# Patient Record
Sex: MALE | Race: WHITE | Employment: FULL TIME | URBAN - NONMETROPOLITAN AREA
[De-identification: names, ages, dates, MRNs, and addresses within clinical notes are randomized per-mention and may not be internally consistent; named-entity substitution may affect disease eponyms.]

---

## 2017-05-04 ENCOUNTER — APPOINTMENT (OUTPATIENT)
Dept: GENERAL RADIOLOGY | Age: 42
End: 2017-05-04

## 2017-05-04 ENCOUNTER — HOSPITAL ENCOUNTER (EMERGENCY)
Age: 42
Discharge: HOME | End: 2017-05-04

## 2017-05-04 VITALS
HEART RATE: 76 BPM | DIASTOLIC BLOOD PRESSURE: 76 MMHG | BODY MASS INDEX: 24.65 KG/M2 | OXYGEN SATURATION: 99 % | SYSTOLIC BLOOD PRESSURE: 122 MMHG | HEIGHT: 72 IN | TEMPERATURE: 98 F | WEIGHT: 182 LBS | RESPIRATION RATE: 20 BRPM

## 2017-05-04 DIAGNOSIS — S61.219A LACERATION OF FINGER, INITIAL ENCOUNTER: Primary | ICD-10-CM

## 2017-05-04 PROCEDURE — 12002 RPR S/N/AX/GEN/TRNK2.6-7.5CM: CPT | Performed by: NURSE PRACTITIONER

## 2017-05-04 PROCEDURE — 99282 EMERGENCY DEPT VISIT SF MDM: CPT

## 2017-05-04 PROCEDURE — 6360000002 HC RX W HCPCS: Performed by: NURSE PRACTITIONER

## 2017-05-04 PROCEDURE — 12002 RPR S/N/AX/GEN/TRNK2.6-7.5CM: CPT

## 2017-05-04 PROCEDURE — 90471 IMMUNIZATION ADMIN: CPT | Performed by: NURSE PRACTITIONER

## 2017-05-04 PROCEDURE — 90715 TDAP VACCINE 7 YRS/> IM: CPT | Performed by: NURSE PRACTITIONER

## 2017-05-04 PROCEDURE — 2500000003 HC RX 250 WO HCPCS: Performed by: NURSE PRACTITIONER

## 2017-05-04 PROCEDURE — 73130 X-RAY EXAM OF HAND: CPT

## 2017-05-04 RX ORDER — ONDANSETRON 4 MG/1
4 TABLET, ORALLY DISINTEGRATING ORAL ONCE
Status: COMPLETED | OUTPATIENT
Start: 2017-05-04 | End: 2017-05-04

## 2017-05-04 RX ORDER — CEPHALEXIN 500 MG/1
500 CAPSULE ORAL 4 TIMES DAILY
Qty: 28 CAPSULE | Refills: 0 | Status: SHIPPED | OUTPATIENT
Start: 2017-05-04

## 2017-05-04 RX ORDER — LIDOCAINE HYDROCHLORIDE 10 MG/ML
20 INJECTION, SOLUTION INFILTRATION; PERINEURAL ONCE
Status: COMPLETED | OUTPATIENT
Start: 2017-05-04 | End: 2017-05-04

## 2017-05-04 RX ORDER — OXYCODONE HYDROCHLORIDE AND ACETAMINOPHEN 5; 325 MG/1; MG/1
1 TABLET ORAL EVERY 4 HOURS PRN
Qty: 15 TABLET | Refills: 0 | Status: SHIPPED | OUTPATIENT
Start: 2017-05-04 | End: 2017-05-11

## 2017-05-04 RX ORDER — ONDANSETRON 4 MG/1
4 TABLET, ORALLY DISINTEGRATING ORAL EVERY 8 HOURS PRN
Qty: 10 TABLET | Refills: 0 | Status: SHIPPED | OUTPATIENT
Start: 2017-05-04

## 2017-05-04 RX ADMIN — TETANUS TOXOID, REDUCED DIPHTHERIA TOXOID AND ACELLULAR PERTUSSIS VACCINE, ADSORBED 0.5 ML: 5; 2.5; 8; 8; 2.5 SUSPENSION INTRAMUSCULAR at 21:27

## 2017-05-04 RX ADMIN — LIDOCAINE HYDROCHLORIDE 20 ML: 10 INJECTION, SOLUTION INFILTRATION; PERINEURAL at 21:25

## 2017-05-04 RX ADMIN — ONDANSETRON 4 MG: 4 TABLET, ORALLY DISINTEGRATING ORAL at 21:36

## 2017-05-04 ASSESSMENT — PAIN SCALES - GENERAL: PAINLEVEL_OUTOF10: 0

## 2017-09-11 ENCOUNTER — OFFICE VISIT (OUTPATIENT)
Dept: UROLOGY | Facility: CLINIC | Age: 42
End: 2017-09-11

## 2017-09-11 VITALS — TEMPERATURE: 97.5 F | BODY MASS INDEX: 26.01 KG/M2 | HEIGHT: 72 IN | WEIGHT: 192 LBS

## 2017-09-11 DIAGNOSIS — Z30.09 VISIT FOR VASECTOMY EVALUATION: Primary | ICD-10-CM

## 2017-09-11 PROCEDURE — 99203 OFFICE O/P NEW LOW 30 MIN: CPT | Performed by: UROLOGY

## 2017-09-11 RX ORDER — ALPRAZOLAM 2 MG/1
2 TABLET ORAL AS NEEDED
Qty: 1 TABLET | Refills: 0 | Status: SHIPPED | OUTPATIENT
Start: 2017-09-11 | End: 2020-03-04

## 2017-09-11 RX ORDER — HYDROCODONE BITARTRATE AND ACETAMINOPHEN 7.5; 325 MG/1; MG/1
1 TABLET ORAL EVERY 4 HOURS PRN
Qty: 16 TABLET | Refills: 0 | Status: SHIPPED | OUTPATIENT
Start: 2017-09-11 | End: 2020-03-04

## 2017-09-11 NOTE — PROGRESS NOTES
Mr. Gilbert is 42 y.o. male    CHIEF COMPLAINT: I am here to discuss a vasectomy.     HPI  The patient has been pondering the option of a vasectomy for>5 months. With regard to context of the decision, he presently has No children. He is . Associated/Relevant symptoms/signs include None. He voices no additional questions about birth control options.       The following portions of the patient's history were reviewed and updated as appropriate: allergies, current medications, past family history, past medical history, past social history, past surgical history and problem list.    Review of Systems   Constitutional: Negative for appetite change and fever.   HENT: Negative for hearing loss and sore throat.    Eyes: Negative for pain and redness.   Respiratory: Negative for cough and shortness of breath.    Cardiovascular: Negative for chest pain and leg swelling.   Gastrointestinal: Negative for anal bleeding, nausea and vomiting.   Endocrine: Negative for cold intolerance and heat intolerance.   Genitourinary: Negative for dysuria, flank pain and hematuria.   Musculoskeletal: Negative for joint swelling and myalgias.   Skin: Negative for color change and rash.   Allergic/Immunologic: Negative for immunocompromised state.   Neurological: Negative for dizziness and speech difficulty.   Hematological: Negative for adenopathy. Does not bruise/bleed easily.   Psychiatric/Behavioral: Negative for dysphoric mood and suicidal ideas.       No current outpatient prescriptions on file.    History reviewed. No pertinent past medical history.    History reviewed. No pertinent surgical history.    Social History     Social History   • Marital status:      Spouse name: N/A   • Number of children: N/A   • Years of education: N/A     Social History Main Topics   • Smoking status: Never Smoker   • Smokeless tobacco: None   • Alcohol use None   • Drug use: None   • Sexual activity: Not Asked     Other Topics Concern   •  "None     Social History Narrative   • None       No family history on file.    Temp 97.5 °F (36.4 °C)  Ht 72\" (182.9 cm)  Wt 192 lb (87.1 kg)  BMI 26.04 kg/m2    Physical Exam  Constitutional: He is oriented to person, place, and time. He appears well-developed and well-nourished.   HENT:   Head: Normocephalic and atraumatic.   Eyes: Conjunctivae are normal.   Neck: Normal range of motion. Neck supple.   Cardiovascular: Normal rate and regular rhythm.    Pulmonary/Chest: Effort normal. No respiratory distress. He exhibits no tenderness.   Abdominal: Soft. He exhibits no distension. There is no tenderness. Hernia confirmed negative in the right inguinal area and confirmed negative in the left inguinal area.   Genitourinary: Testes normal and penis normal. Right testis shows no mass, no swelling and no tenderness. Left testis shows no mass, no swelling and no tenderness. No phimosis, paraphimosis or penile tenderness.   Genitourinary Comments: The epididymis is palpably normal bilaterally without mass.  The vas deferens is also palpable bilaterally in the usual location and appears accessible for vasectomy.   Musculoskeletal: Normal range of motion. He exhibits no edema or deformity.   Neurological: He is alert and oriented to person, place, and time.   Skin: Skin is warm and dry.   Psychiatric: He has a normal mood and affect. His behavior is normal.   Vitals reviewed.        No results found for this or any previous visit.    Imaging Results (last 7 days)     ** No results found for the last 168 hours. **          Assessment and Plan  Diagnoses and all orders for this visit:    Visit for vasectomy evaluation  The patient desires vasectomy.  Risks of this procedure are discussed.  We discussed that it does take up to 6 months for a patient to clear the proximal sperm through the process of ejaculation.  It is explained that postoperative specimens are essential before consideration of birth control cessation.  " Risks of bleeding, infection, sperm granuloma, testicular pain that can become prolonged such as post vasectomy neuralgia, recanalization with resumption of fertility status, testicular atrophy are included in the discussion.  The patient is made aware of other birth control options including permanent sterilization procedures for females.  It is also explained the vasectomy does not reduce the risk of sexually transmitted disease.  Discussion of the use of preprocedural benzodiazepine and postoperative opiate narcotic relief is also undertaken.  Brief addiction assessment concluded.  The patient has consented to the procedure.      Quintin León MD  09/11/17  8:50 AM      Cc: Samir Le MD

## 2017-10-31 ENCOUNTER — TRANSCRIBE ORDERS (OUTPATIENT)
Dept: ADMINISTRATIVE | Facility: HOSPITAL | Age: 42
End: 2017-10-31

## 2017-10-31 DIAGNOSIS — Z00.00 ROUTINE GENERAL MEDICAL EXAMINATION AT A HEALTH CARE FACILITY: Primary | ICD-10-CM

## 2017-11-10 ENCOUNTER — OFFICE VISIT (OUTPATIENT)
Dept: UROLOGY | Facility: CLINIC | Age: 42
End: 2017-11-10

## 2017-11-10 ENCOUNTER — APPOINTMENT (OUTPATIENT)
Dept: CT IMAGING | Facility: HOSPITAL | Age: 42
End: 2017-11-10
Attending: FAMILY MEDICINE

## 2017-11-10 DIAGNOSIS — Z30.2 ENCOUNTER FOR VASECTOMY: Primary | ICD-10-CM

## 2017-11-10 PROCEDURE — 55250 REMOVAL OF SPERM DUCT(S): CPT | Performed by: UROLOGY

## 2017-11-15 ENCOUNTER — HOSPITAL ENCOUNTER (OUTPATIENT)
Dept: CT IMAGING | Facility: HOSPITAL | Age: 42
End: 2017-11-15
Attending: FAMILY MEDICINE

## 2019-01-11 ENCOUNTER — OFFICE VISIT (OUTPATIENT)
Dept: FAMILY MEDICINE CLINIC | Facility: CLINIC | Age: 44
End: 2019-01-11

## 2019-01-11 VITALS
SYSTOLIC BLOOD PRESSURE: 142 MMHG | TEMPERATURE: 98.2 F | WEIGHT: 187 LBS | HEIGHT: 72 IN | RESPIRATION RATE: 18 BRPM | HEART RATE: 78 BPM | DIASTOLIC BLOOD PRESSURE: 76 MMHG | OXYGEN SATURATION: 99 % | BODY MASS INDEX: 25.33 KG/M2

## 2019-01-11 DIAGNOSIS — J06.9 UPPER RESPIRATORY TRACT INFECTION, UNSPECIFIED TYPE: Primary | ICD-10-CM

## 2019-01-11 DIAGNOSIS — J02.9 PHARYNGITIS, UNSPECIFIED ETIOLOGY: ICD-10-CM

## 2019-01-11 PROCEDURE — 96372 THER/PROPH/DIAG INJ SC/IM: CPT | Performed by: FAMILY MEDICINE

## 2019-01-11 PROCEDURE — 99202 OFFICE O/P NEW SF 15 MIN: CPT | Performed by: FAMILY MEDICINE

## 2019-01-11 RX ORDER — DEXAMETHASONE SODIUM PHOSPHATE 10 MG/ML
10 INJECTION INTRAMUSCULAR; INTRAVENOUS ONCE
Status: COMPLETED | OUTPATIENT
Start: 2019-01-11 | End: 2019-01-11

## 2019-01-11 RX ORDER — DEXAMETHASONE SODIUM PHOSPHATE 10 MG/ML
10 INJECTION INTRAMUSCULAR; INTRAVENOUS ONCE
Status: CANCELLED | OUTPATIENT
Start: 2019-01-11

## 2019-01-11 RX ADMIN — DEXAMETHASONE SODIUM PHOSPHATE 10 MG: 10 INJECTION INTRAMUSCULAR; INTRAVENOUS at 12:26

## 2019-01-11 NOTE — PROGRESS NOTES
"Subjective cc: sore throat   Jack Gilbert is a 43 y.o. male who presents with complaint of sore throat.  Pt reports he was eating and sneezed and then felt a pop on left side of throat. Since that time he has been having left ear pain and sore throat. 8/10 when swallows. He has tried flonase and zyrtec without improvement.  He has been taking amoxicillin. He developed congestion last night. Pt voice sounds different to him.     Sore Throat    This is a new problem. The current episode started in the past 7 days. The problem has been unchanged. The pain is worse on the left side. There has been no fever. The pain is at a severity of 8/10 (when swallowing ). The pain is moderate. Associated symptoms include ear pain (left) and trouble swallowing. He has had no exposure to strep or mono. Treatments tried: zyrtec, flonase. The treatment provided no relief.        The following portions of the patient's history were reviewed and updated as appropriate: allergies, current medications, past family history, past medical history, past social history, past surgical history and problem list.        Review of Systems   Constitutional: Negative for chills and fever.   HENT: Positive for ear pain (left), sore throat and trouble swallowing.    All other systems reviewed and are negative.      Objective   Blood pressure 142/76, pulse 78, temperature 98.2 °F (36.8 °C), temperature source Oral, resp. rate 18, height 182.9 cm (72\"), weight 84.8 kg (187 lb), SpO2 99 %.  Physical Exam   Constitutional: He is oriented to person, place, and time. He appears well-developed and well-nourished. He is active and cooperative.  Non-toxic appearance. He has a sickly appearance. No distress.   HENT:   Head: Normocephalic and atraumatic.   Right Ear: External ear normal.   Left Ear: External ear normal.   Nose: Nose normal.   Mouth/Throat: Oropharynx is clear and moist. No oropharyngeal exudate.   Eyes: Conjunctivae and EOM are normal. Right eye " exhibits no discharge. Left eye exhibits no discharge.   Neck: Normal range of motion. No tracheal deviation present. No thyromegaly present.   Cardiovascular: Normal rate, regular rhythm, normal heart sounds and intact distal pulses.   Pulmonary/Chest: Effort normal and breath sounds normal. No stridor. No respiratory distress. He has no wheezes. He exhibits no tenderness.   Lymphadenopathy:     He has no cervical adenopathy.   Neurological: He is alert and oriented to person, place, and time. He exhibits normal muscle tone. Coordination normal.   Skin: Skin is warm and dry. He is not diaphoretic.   Psychiatric: He has a normal mood and affect. His behavior is normal. Judgment and thought content normal.   Nursing note and vitals reviewed.      Assessment/Plan   Problems Addressed this Visit     None      Visit Diagnoses     Upper respiratory tract infection, unspecified type    -  Primary    Pharyngitis, unspecified etiology        Relevant Medications    dexamethasone (DECADRON) injection 10 mg (Completed)          PLAN:     #1 pharyngitis/URI: new, steroid shot today to help with symptoms, advised on conservative measures, can complete course of abx since he has already started them, return if not improving           This document has been electronically signed by Fiona Raines MD on January 21, 2019 1:06 PM

## 2019-01-22 DIAGNOSIS — Z30.2 ENCOUNTER FOR VASECTOMY: Primary | ICD-10-CM

## 2020-01-07 ENCOUNTER — LAB (OUTPATIENT)
Dept: LAB | Facility: HOSPITAL | Age: 45
End: 2020-01-07

## 2020-01-07 ENCOUNTER — TELEPHONE (OUTPATIENT)
Dept: UROLOGY | Facility: CLINIC | Age: 45
End: 2020-01-07

## 2020-01-07 DIAGNOSIS — Z30.2 ENCOUNTER FOR VASECTOMY: ICD-10-CM

## 2020-01-07 LAB — SPERM - POST VASECTOMY: NORMAL

## 2020-01-07 PROCEDURE — 89321 SEMEN ANAL SPERM DETECTION: CPT | Performed by: UROLOGY

## 2020-01-07 NOTE — TELEPHONE ENCOUNTER
Patient's wife called to ask questions about the process of bringing in a semen sample. I told her the patient or herself would need to  a sample cup first. Then he would need to do the sample and bring it to the lab as soon as he can, no more than 2 hours after the sample was done. She voiced understanding.

## 2020-03-04 ENCOUNTER — OFFICE VISIT (OUTPATIENT)
Dept: FAMILY MEDICINE CLINIC | Facility: CLINIC | Age: 45
End: 2020-03-04

## 2020-03-04 VITALS
WEIGHT: 190.2 LBS | BODY MASS INDEX: 25.76 KG/M2 | TEMPERATURE: 98.2 F | HEIGHT: 72 IN | OXYGEN SATURATION: 98 % | HEART RATE: 67 BPM | SYSTOLIC BLOOD PRESSURE: 139 MMHG | DIASTOLIC BLOOD PRESSURE: 78 MMHG | RESPIRATION RATE: 18 BRPM

## 2020-03-04 DIAGNOSIS — B34.9 VIRAL SYNDROME: ICD-10-CM

## 2020-03-04 DIAGNOSIS — R52 GENERALIZED BODY ACHES: Primary | ICD-10-CM

## 2020-03-04 LAB
EXPIRATION DATE: NORMAL
FLUAV AG NPH QL: NEGATIVE
FLUBV AG NPH QL: NEGATIVE
INTERNAL CONTROL: NORMAL
Lab: NORMAL

## 2020-03-04 PROCEDURE — 87804 INFLUENZA ASSAY W/OPTIC: CPT | Performed by: NURSE PRACTITIONER

## 2020-03-04 PROCEDURE — 99213 OFFICE O/P EST LOW 20 MIN: CPT | Performed by: NURSE PRACTITIONER

## 2020-03-04 NOTE — PROGRESS NOTES
Chief Complaint   Patient presents with   • Generalized Body Aches     Headache and chills.          No Known Allergies    HPI:  Jack Gilbert is a 44 y.o. male presents today with complaints of generalized body aches, headache, chills that was a sudden onset yesterday.      History reviewed. No pertinent past medical history.  Past Surgical History:   Procedure Laterality Date   • VASECTOMY  11/10/2017     Social History     Socioeconomic History   • Marital status:      Spouse name: Not on file   • Number of children: Not on file   • Years of education: Not on file   • Highest education level: Not on file   Tobacco Use   • Smoking status: Never Smoker   • Smokeless tobacco: Never Used   Substance and Sexual Activity   • Alcohol use: No     Frequency: Never   • Drug use: Defer   • Sexual activity: Defer     Family History   Problem Relation Age of Onset   • No Known Problems Mother    • Heart disease Father    • No Known Problems Sister        Current Outpatient Medications on File Prior to Visit   Medication Sig Dispense Refill   • [DISCONTINUED] ALPRAZolam (XANAX) 2 MG tablet Take 1 tablet by mouth As Needed for Anxiety for up to 1 dose. 1 tablet 0   • [DISCONTINUED] HYDROcodone-acetaminophen (NORCO) 7.5-325 MG per tablet Take 1 tablet by mouth Every 4 (Four) Hours As Needed for Moderate Pain  or Severe Pain  for up to 16 doses. 16 tablet 0     No current facility-administered medications on file prior to visit.         ROS:  REVIEW OF SYMPTOMS: (Positives bolded)  General:  weight loss, fever, chills, night sweats, fatigue, appetite loss, body aches  HEENT:  sore throat tinnitus, bloody nose, hearing loss, sinus pain/pressure, ear pain/pressure.   Respiratory: shortness of breath, cough, hemoptysis, wheezing, pleurisy,   Cardiovascular:  chest pain, PND, palpitation, edema, orthopnea, syncope, swelling of extremities  Gastro: Nausea, vomiting, diarrhea, hematemesis, abdominal pain, constipation  Neuro:   "Migraine, numbness, ataxia, tremor, vertigo, weakness, memory loss, Irritability, headache  Allergic/immune: allergic rhinitis, hay fever, asthma, hives  \"All other systems reviewed and negative, except as listed above.”      OBJECTIVE:  Constitutional:  Alert, oriented x 3, well developed, well nourished. Consistent with stated age. Not in acute distress.  Has normal posture. Gait and station normal. Behavior appropriate. Patient is pleasant and cooperative with the interview and exam.    Skin: No rashes, no visible scars or suspicious moles noted.  Skin is warm to touch. Normal appropriate skin turgor.  Capillary refill is normal bilateral Upper and lower extremity.     Head/Neck: Head is normocephalic and atraumatic.  Neck without visible/palpable lumps.  No thyromegaly.    Eye: Bilaterally EOMI.  PERRLA.  Sclera/conjunctiva is normal, no discharge. Cornea is normal and clear. Lens is normal.  Eyeball normal. Upper eyelid normal.  Lower eyelid normal.     OROPHARYNX: without redness or post nasal drip, no exudate, no irregularities, tonsils normal, Mucosa pink and moist. Dentition average for age. No obvious dental carries. No lesions. Tongue normal.    EARS: Bilateral auditory canal normal, without redness or cerumen impaction. Bilateral Tympanic membrane Normal, pearly gray with good cone of light and landmarks.  Hearing grossly intact to normal conversation.     NOSE: External appearance normal/midline Bilateral nares normal, without purulent discharge     CHEST/LUNG: No use of accessory muscles, chest non-tender on palpation.  Breath sounds normal throughout all lung fields.  No wheezes, rales, rhonchi.    CARDIOVASCULAR:  Inspection: Carotid artery bilateral normal, no bruits, pulse regular.  Palpation/Percussion Bilateral normal pulsations.  Auscultation: Regular rate and rhythm. No murmur noted in sitting, supine, standing or squatting positions.    LYMPH: Cervical Nodes-normal, size; non-tender to " palpation. Axillary Nodes- normal size; non-tender to palpation.     Assessment:  Jack was seen today for generalized body aches.    Diagnoses and all orders for this visit:    Generalized body aches  -     POCT Influenza A/B    Viral syndrome       Good handwashing       Treat the symptoms         POCT Influenza A/B   Order: [id number]   Status:  Final result   Visible to patient:  No (Not Released) Next appt:  None Dx:  Generalized body aches   Specimen Information: Swab        Component  Ref Range & Units 15:07   Rapid Influenza A Ag  Negative Negative    Rapid Influenza B Ag  Negative Negative    Internal Control  Passed Passed    Lot Number [id number]    Expiration Date 5,062,022    [facility]         Specimen Collected: 03/04/20 15:07 Last Resulted: 03/04/20 15:07                 • Adjunctive Therapy  a. Intranasal saline irrigation with either physiologic or hypertonic saline is recommended  b. Intranasal corticosteroids in addition to antibiotics  c. Netti pot  d. Good handwashing  e. If smoke-recommend smoking cessation  f.  Humidify the air; steam inhalation and warm compresses often help relieve pressure  g. Increase fluid intake  h. Sleep with bed elevated  i. Avoid allergens and excessively dry heat  j. Avoid swimming/diving and air travel during acute period  k. Avoid use of antihistamines unless there is an allergic basis   l. Teach proper application of nasal sprays      I spoke with the patient about the benefits and risks associated with antibiotic therapy; the benefits include treating a bacterial infection, preventing the spread of disease, and minimizing serious complications associated with bacterial diseases; the risks include antibiotic resistance, allergic reactions, oral and/ or vaginal candidia, and GI related side effects such as an upset stomach and diarrhea, as well as placing the patient at an increase risk for C-diff; verbal acknowledgement of  these risk were obtained; based on the patients complaint and clinical finding, no antibiotics are required at this time.             Return in about 1 week (around 3/11/2020), or if symptoms worsen or fail to improve.    Electronically signed by Cori Nguyen DNP, APRN, 03/04/20, 3:11 PM.

## 2020-03-06 ENCOUNTER — OFFICE VISIT (OUTPATIENT)
Dept: FAMILY MEDICINE CLINIC | Facility: CLINIC | Age: 45
End: 2020-03-06

## 2020-03-06 VITALS
HEIGHT: 72 IN | HEART RATE: 67 BPM | DIASTOLIC BLOOD PRESSURE: 88 MMHG | TEMPERATURE: 98.1 F | OXYGEN SATURATION: 98 % | BODY MASS INDEX: 26.06 KG/M2 | RESPIRATION RATE: 18 BRPM | WEIGHT: 192.4 LBS | SYSTOLIC BLOOD PRESSURE: 132 MMHG

## 2020-03-06 DIAGNOSIS — J02.9 PHARYNGITIS, UNSPECIFIED ETIOLOGY: Primary | ICD-10-CM

## 2020-03-06 DIAGNOSIS — J02.9 SORE THROAT: ICD-10-CM

## 2020-03-06 DIAGNOSIS — J98.01 BRONCHOSPASM: ICD-10-CM

## 2020-03-06 DIAGNOSIS — R09.82 POST-NASAL DRAINAGE: ICD-10-CM

## 2020-03-06 LAB
EXPIRATION DATE: NORMAL
INTERNAL CONTROL: NORMAL
Lab: NORMAL
S PYO AG THROAT QL: NEGATIVE

## 2020-03-06 PROCEDURE — 99213 OFFICE O/P EST LOW 20 MIN: CPT | Performed by: NURSE PRACTITIONER

## 2020-03-06 PROCEDURE — 96372 THER/PROPH/DIAG INJ SC/IM: CPT | Performed by: NURSE PRACTITIONER

## 2020-03-06 PROCEDURE — 87880 STREP A ASSAY W/OPTIC: CPT | Performed by: NURSE PRACTITIONER

## 2020-03-06 RX ORDER — DEXAMETHASONE SODIUM PHOSPHATE 4 MG/ML
10 INJECTION, SOLUTION INTRA-ARTICULAR; INTRALESIONAL; INTRAMUSCULAR; INTRAVENOUS; SOFT TISSUE ONCE
Status: COMPLETED | OUTPATIENT
Start: 2020-03-06 | End: 2020-03-06

## 2020-03-06 RX ORDER — AZITHROMYCIN 250 MG/1
TABLET, FILM COATED ORAL
Qty: 6 TABLET | Refills: 0 | Status: SHIPPED | OUTPATIENT
Start: 2020-03-06

## 2020-03-06 RX ADMIN — DEXAMETHASONE SODIUM PHOSPHATE 10 MG: 4 INJECTION, SOLUTION INTRA-ARTICULAR; INTRALESIONAL; INTRAMUSCULAR; INTRAVENOUS; SOFT TISSUE at 08:52

## 2020-03-06 NOTE — PROGRESS NOTES
Subjective   Jack Gilbert is a 44 y.o. male.     URI    This is a new problem. The current episode started in the past 7 days (3 days). The problem has been unchanged. There has been no fever. Associated symptoms include congestion, coughing (moderate productive light yellow), headaches, rhinorrhea, a sore throat and wheezing. Pertinent negatives include no diarrhea, ear pain, nausea or vomiting.        The following portions of the patient's history were reviewed and updated as appropriate: allergies, current medications, past family history, past medical history, past social history, past surgical history and problem list.    Review of Systems   Constitutional: Positive for activity change and fatigue. Negative for appetite change, chills, diaphoresis and fever.   HENT: Positive for congestion, postnasal drip, rhinorrhea, sinus pressure and sore throat. Negative for ear pain.    Respiratory: Positive for cough (moderate productive light yellow) and wheezing. Negative for chest tightness and shortness of breath.    Gastrointestinal: Negative for diarrhea, nausea and vomiting.   Neurological: Positive for headaches.       Objective     Vitals:    03/06/20 0830   BP: 132/88   Pulse: 67   Resp: 18   Temp: 98.1 °F (36.7 °C)   SpO2: 98%       Physical Exam   Constitutional: He appears well-developed and well-nourished. No distress.   HENT:   Right Ear: Tympanic membrane and ear canal normal.   Left Ear: Tympanic membrane and ear canal normal.   Nose: Mucosal edema and rhinorrhea present. Right sinus exhibits no maxillary sinus tenderness and no frontal sinus tenderness. Left sinus exhibits no maxillary sinus tenderness and no frontal sinus tenderness.   Mouth/Throat: Uvula is midline. No uvula swelling. Oropharyngeal exudate and posterior oropharyngeal erythema present.   Eyes: Conjunctivae are normal.   Neck: Neck supple. No tracheal deviation present. No thyromegaly present.   Cardiovascular: Normal rate, regular  rhythm and normal heart sounds.   Pulmonary/Chest: Effort normal and breath sounds normal.   Lymphadenopathy:     He has cervical adenopathy.        Right cervical: Superficial cervical adenopathy present.        Left cervical: Superficial cervical adenopathy present.   Neurological: He is alert.   Skin: Skin is warm and dry.   Psychiatric: He has a normal mood and affect. His behavior is normal.   Nursing note and vitals reviewed.         Patient's Body mass index is 26.09 kg/m². BMI is above normal parameters. Recommendations include: none (medical contraindication) (acutely ill today).       Assessment/Plan   Jack was seen today for nasal congestion and sore throat.    Diagnoses and all orders for this visit:    Pharyngitis, unspecified etiology  -     dexamethasone sodium phosphate injection 10 mg    Sore throat  -     POCT rapid strep A    Post-nasal drainage    Bronchospasm  -     dexamethasone sodium phosphate injection 10 mg    Other orders  -     azithromycin (ZITHROMAX) 250 MG tablet; Take 2 tablets the first day, then 1 tablet daily for 4 days.  -     loratadine-pseudoephedrine (CLARITIN-D 24 HOUR)  MG per 24 hr tablet; Take 1 tablet by mouth Daily.      Lab Results (last 24 hours)     Procedure Component Value Units Date/Time    POCT rapid strep A [[id number]]  (Normal) Collected:  03/06/20 0835    Specimen:  Swab Updated:  03/06/20 0839     Rapid Strep A Screen Negative     Internal Control Passed     Lot Number [id number]     Expiration Date 3,312,021            Return in about 1 week (around 3/13/2020), or if symptoms worsen or fail to improve.             Electronically signed by FRANK Lewis, 03/06/20, 8:36 AM.

## 2022-09-09 LAB
CULTURE EAR OR EYE: ABNORMAL
GRAM STAIN RESULT: ABNORMAL
ORGANISM: ABNORMAL

## 2023-07-24 ENCOUNTER — HOSPITAL ENCOUNTER (OUTPATIENT)
Dept: MRI IMAGING | Facility: HOSPITAL | Age: 48
Discharge: HOME | End: 2023-07-24
Admitting: FAMILY MEDICINE
Payer: COMMERCIAL

## 2023-07-24 DIAGNOSIS — N40.1 BENIGN LOCALIZED PROSTATIC HYPERPLASIA WITH LOWER URINARY TRACT SYMPTOMS (LUTS): ICD-10-CM

## 2023-07-24 PROCEDURE — A9577 INJ MULTIHANCE: HCPCS | Performed by: FAMILY MEDICINE

## 2023-07-24 PROCEDURE — 72197 MRI PELVIS W/O & W/DYE: CPT

## 2023-07-24 PROCEDURE — 0 GADOBENATE DIMEGLUMINE 529 MG/ML SOLUTION: Performed by: FAMILY MEDICINE

## 2023-07-24 RX ADMIN — GADOBENATE DIMEGLUMINE 16 ML: 529 INJECTION, SOLUTION INTRAVENOUS at 16:46
